# Patient Record
Sex: MALE | Race: WHITE | URBAN - METROPOLITAN AREA
[De-identification: names, ages, dates, MRNs, and addresses within clinical notes are randomized per-mention and may not be internally consistent; named-entity substitution may affect disease eponyms.]

---

## 2019-03-03 ENCOUNTER — OFFICE VISIT (OUTPATIENT)
Dept: FAMILY MEDICINE CLINIC | Facility: CLINIC | Age: 29
End: 2019-03-03
Payer: COMMERCIAL

## 2019-03-03 VITALS
RESPIRATION RATE: 18 BRPM | OXYGEN SATURATION: 100 % | TEMPERATURE: 98 F | WEIGHT: 163.5 LBS | HEIGHT: 69 IN | BODY MASS INDEX: 24.22 KG/M2 | DIASTOLIC BLOOD PRESSURE: 84 MMHG | HEART RATE: 76 BPM | SYSTOLIC BLOOD PRESSURE: 116 MMHG

## 2019-03-03 DIAGNOSIS — H61.23 BILATERAL IMPACTED CERUMEN: ICD-10-CM

## 2019-03-03 DIAGNOSIS — J01.00 ACUTE NON-RECURRENT MAXILLARY SINUSITIS: Primary | ICD-10-CM

## 2019-03-03 PROCEDURE — 99213 OFFICE O/P EST LOW 20 MIN: CPT | Performed by: PHYSICIAN ASSISTANT

## 2019-03-03 RX ORDER — AMOXICILLIN AND CLAVULANATE POTASSIUM 875; 125 MG/1; MG/1
1 TABLET, FILM COATED ORAL 2 TIMES DAILY
Qty: 20 TABLET | Refills: 0 | Status: SHIPPED | OUTPATIENT
Start: 2019-03-03 | End: 2019-03-13

## 2019-03-03 NOTE — PROGRESS NOTES
CHIEF COMPLAINT:   Patient presents with:  Sinus Problem: sinus pressure/congestion x 11 days. Ear Pain: x 7-8 days. no fever      HPI:   Yg Dunbar is a 29year old male who presents for cold symptoms for  11 days.   (+) nasal congestion, sinus pres /84 (BP Location: Left arm, Patient Position: Sitting, Cuff Size: adult)   Pulse 76   Temp 97.6 °F (36.4 °C) (Oral)   Resp 18   Ht 69\"   Wt 163 lb 8 oz   SpO2 100%   BMI 24.14 kg/m²   GENERAL: well developed, well nourished,in no apparent distress 1. Augmentin 875 mg twice daily for 10 days.    2.  Encourage fluids, humidifier/vaporizor at bedside, elevate head of bed (sleep with extra pillow), vapor rub to chest, steam therapy if no fever, warm compresses for sinus pressure if no fever, salt water · You can use an over-the-counter decongestant, unless a similar medicine was prescribed to you. Nasal sprays work the fastest. Use one that contains phenylephrine or oxymetazoline. First blow your nose gently. Then use the spray.  Do not use these medicine · Don’t have close contact with people who have sore throats, colds, or other upper respiratory infections. · Don’t smoke, and stay away from secondhand smoke. · Stay up to date with of your vaccines.   Date Last Reviewed: 11/1/2017  © 3786-8192 [employer] · Too much earwax because of  water in the ear canal  Objects repeatedly placed in the ear can also cause impacted earwax. For example, putting cotton swabs in the ear may push the wax deeper into the ear. Over time, this may cause blockage.  Hearing aids, · Having routine cleaning of the ear about every 6 months  · Not using cotton swabs in the ear  When to call the healthcare provider  Call your healthcare provider if you have symptoms of impacted earwax.  Also call right away if you have severe symptoms af

## 2019-03-03 NOTE — PATIENT INSTRUCTIONS
1. Augmentin 875 mg twice daily for 10 days.    2.  Encourage fluids, humidifier/vaporizor at bedside, elevate head of bed (sleep with extra pillow), vapor rub to chest, steam therapy if no fever, warm compresses for sinus pressure if no fever, salt water oxymetazoline. First blow your nose gently. Then use the spray. Do not use these medicines more often than directed on the label. If you do, your symptoms may get worse. You may also take pills that contain pseudoephedrine.  Don’t use products that combine date with of your vaccines. Date Last Reviewed: 11/1/2017  © 4764-2904 [employer]. [address] [facility] [place], [facility] [employer]. All rights reserved. This information is not intended as a substitute for professional medical care.  Always follow time, this may cause blockage. Hearing aids, swimming plugs, and swim molds can cause the same problem when used again and again. In some cases, the cause of impacted earwax is not known.   Symptoms of impacted earwax  Excess earwax usually does not cause severe symptoms after earwax removal. These may include bleeding or severe ear pain. Date Last Reviewed: 5/1/2017  © 2114-0294 [employer]. 1407 [facility] [place], [address] [place] [address]. All rights reserved.  This information is not intended as